# Patient Record
Sex: MALE
[De-identification: names, ages, dates, MRNs, and addresses within clinical notes are randomized per-mention and may not be internally consistent; named-entity substitution may affect disease eponyms.]

---

## 2020-10-31 ENCOUNTER — HOSPITAL ENCOUNTER (EMERGENCY)
Dept: HOSPITAL 72 - EMR | Age: 25
Discharge: HOME | End: 2020-10-31
Payer: SELF-PAY

## 2020-10-31 VITALS — DIASTOLIC BLOOD PRESSURE: 70 MMHG | SYSTOLIC BLOOD PRESSURE: 116 MMHG

## 2020-10-31 VITALS — BODY MASS INDEX: 25.77 KG/M2 | HEIGHT: 70 IN | WEIGHT: 180 LBS

## 2020-10-31 VITALS — SYSTOLIC BLOOD PRESSURE: 120 MMHG | DIASTOLIC BLOOD PRESSURE: 73 MMHG

## 2020-10-31 DIAGNOSIS — B99.9: ICD-10-CM
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
## 2020-10-31 DIAGNOSIS — T16.1XXA: Primary | ICD-10-CM
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
## 2020-10-31 PROCEDURE — 99282 EMERGENCY DEPT VISIT SF MDM: CPT

## 2020-10-31 NOTE — NUR
ER DISCHARGE NOTE:

Patient is cleared to be discharged per ERMD, pt is aox4, on room air, with 
stable vital signs. pt was given dc and prescription instructions, pt was able 
to verbalize understanding, pt id band removed. pt is able to ambulate with 
steady gait. pt took all belongings. Pt educated regarding f/u.

## 2020-10-31 NOTE — EMERGENCY ROOM REPORT
History of Present Illness


General


Chief Complaint:  Earache


Source:  Patient





Present Illness


HPI


25-year-old male presents to the emergency department complaining of 8/10 in 

severity tenderness and discharge with some swelling to the right earlobe since 

yesterday.  Patient reports that he sustained a significant amount of trauma 

which pulled his earring inside of his ear.  Patient reports that he was able to

remove the front portion of the earring and it was bent.  Patient states he has 

been trying to get the backing out however he has been unsuccessful.  Patient 

reports he is up-to-date with tetanus vaccines.  He denies bleeding at this 

time.  He denies headache, fevers, chills, tinnitus or changes in hearing.


Allergies:  


Coded Allergies:  


     No Known Allergies (Unverified , 10/31/20)





COVID-19 Screening


Contact w/high risk pt:  No


Experienced COVID-19 symptoms?:  No


COVID-19 Testing performed PTA:  Yes


COVID-19 Screening:  Negative COVID-19


COVID-19 Testing Source:  np





Patient History


Past Medical History:  see triage record


Past Surgical History:  none


Pertinent Family History:  none


Immunizations:  UTD


Reviewed Nursing Documentation:  PMH: Agreed; PSxH: Agreed





Nursing Documentation-PMH


Past Medical History:  No Stated History





Review of Systems


All Other Systems:  negative except mentioned in HPI





Physical Exam





Vital Signs








  Date Time  Temp Pulse Resp B/P (MAP) Pulse Ox O2 Delivery O2 Flow Rate FiO2


 


10/31/20 16:48 99.3 64 20 126/71 (89) 96 Room Air  








Sp02 EP Interpretation:  reviewed, normal


General Appearance:  no apparent distress, alert, GCS 15, non-toxic


Head:  normocephalic, atraumatic


Eyes:  bilateral eye normal inspection, bilateral eye PERRL


ENT:  hearing grossly normal, normal voice, other - Swelling, erythema and 

palpable foreign body of the right earlobe.  There is some right-sided facial 

swelling observed as well.  No palpable lymphadenopathy.


Neck:  full range of motion, no meningismus


Respiratory:  lungs clear, normal breath sounds, speaking full sentences


Cardiovascular #1:  regular rate, rhythm


Musculoskeletal:  normal range of motion, gait/station normal, non-tender


Neurologic:  alert, motor strength/tone normal, oriented x3, sensory intact, 

responsive, speech normal


Psychiatric:  judgement/insight normal


Skin:  other - Erythema and swelling of the right earlobe with palpable foreign 

body


Lymphatic:  no adenopathy





Procedures


Additional Procedure


Procedure Narrative


PROCEDURE: EXTERNAL EAR SOFT TISSUE SLIVER FB REMOVAL:  


Verbal consent from patient was obtained to remove superficial ST FB from Right 

ear lobe 


 Area was cleaned and draped in a sterile fashion using swabs from I & D kit.  


Greater auricular nerve block of the right ear was performed, good anesthetic 

results.  Using hemostats and forceps from I&D Kit, the earring was exposed smal

l0.5cm incsion was made to  posterior surface of the right ear lobe. Ring was 

fully observed and removed with hemostats. 


.  Pt. tolerated well. there were no complications. 


- Direct pressure with gauze was applied and hemostasis was achieved. 


- Sterile dressing was applied by RN





Medical Decision Making


PA Attestation


Dr. Ventura is my supervising Physician whom patient management has been discussed

with.


Diagnostic Impression:  


   Primary Impression:  


   Infected embedded earring


ER Course


25-year-old male presents to the emergency department complaining of 8/10 in 

severity tenderness and discharge with some swelling to the right earlobe since 

yesterday.  Patient reports that he sustained a significant amount of trauma 

which pulled his earring inside of his ear.  Patient reports that he was able to

remove the front portion of the earring and it was bent.  Patient states he has 

been trying to get the backing out however he has been unsuccessful.  Patient 

reports he is up-to-date with tetanus vaccines.  He denies bleeding at this 

time.  He denies headache, fevers, chills, tinnitus or changes in hearing.





Ddx considered but are not limited to cellulitis, retained FB, cyst/abscess, 

puncture wound, laceration, infected embedded earring just to name a few.





Vital signs: are WNL, pt. is afebrile





H&PE are most consistent with retained FB in the ST of the right ear lobe- 

backing of an earring.





ORDERS: none required at this time, the diagnosis is clinical








ED INTERVENTIONS: 





- Incision and removal-- procedure please refer to note. 


-Sterile dressing was applied by RN.








DISCHARGE: At this time pt. is stable for d/c to home. Will provide printed 

patient care instructions, and any necessary prescriptions. Care plan and follow

up instructions have been discussed with the patient prior to discharge.





Last Vital Signs








  Date Time  Temp Pulse Resp B/P (MAP) Pulse Ox O2 Delivery O2 Flow Rate FiO2


 


10/31/20 16:55 99.3 81 19 120/73 99 Room Air  








Disposition:  HOME, SELF-CARE


Condition:  Stable


Scripts


Mupirocin* (MUPIROCIN*) 22 Gm Oint...g.


1 APPLIC TOPIC THREE TIMES A DAY, #22 GM


   Prov: Mili Trujillo         10/31/20 


Amoxicillin/Potassium Clav 875-125* (AUGMENTIN 875-125 TABLET*) 1 Each Tablet


1 TAB ORAL TWICE A DAY for 7 Days, #14 TAB


   Prov: Mili Trujillo         10/31/20


Referrals:  


NOT CHOSEN IPA/MD,REFERRING (PCP)


Patient Instructions:  Ear Foreign Body, Easy-to-Read, Nonsutured Laceration 

Care





Additional Instructions:  


Take medications as directed. 


 ** Follow up with a Primary Care Provider in 3-5 days, even if your symptoms 

have resolved. ** 


--Please review list of primary care clinics, if you do not already have a 

primary care provider





Return sooner to ED if new symptoms occur, or current symptoms become worse. 














- Please note that this Emergency Department Report was dictated using [employer] ??? technology software, occasionally this can lead to 

erroneous entry secondary to interpretation by the dictation equipment.











Mili Trujillo              Oct 31, 2020 18:16

## 2020-10-31 NOTE — NUR
ED Nurse Note:

Pt walked into ED for R ear pain 6/10 since last night. Pt states he hit his 
ear yesterday with earring. Pt is alert and orientedx4, ambulatory. No 
discharge. Denies cough, fever, chills, HA.